# Patient Record
(demographics unavailable — no encounter records)

---

## 2024-10-28 NOTE — IMAGING
[Right] : right ankle [There are no fractures, subluxations or dislocations. No significant abnormalities are seen] : There are no fractures, subluxations or dislocations. No significant abnormalities are seen [Open growth plates] : Open growth plates

## 2024-10-28 NOTE — PHYSICAL EXAM
[Right] : right foot and ankle [NL (40)] : plantar flexion 40 degrees [NL 30)] : inversion 30 degrees [NL (20)] : eversion 20 degrees [5___] : eversion 5[unfilled]/5 [2+] : posterior tibialis pulse: 2+ [] : patient ambulates without assistive device [FreeTextEntry8] : Mildly tender lateral malleolus [de-identified] : Able to heel rise and jump on affected foot with minimal pain

## 2024-10-28 NOTE — HISTORY OF PRESENT ILLNESS
[5] : 5 [4] : 4 [Dull/Aching] : dull/aching [Localized] : localized [Constant] : constant [Standing] : standing [Walking] : walking [Stairs] : stairs [Student] : Work status: student [] : Post Surgical Visit: no [FreeTextEntry1] : Right ankle [FreeTextEntry5] : Patient tripped over the ball and rolled her right ankle today during a soccer game. is having pain with WB, pt plays for [employer].

## 2024-10-28 NOTE — ASSESSMENT
[FreeTextEntry1] : We reviewed the findings and the history. Questions were answered and concerns addressed. The options were outlined. Possibility of occult growth plate injury discussed. AirSport planned. No gym/sports this week. Rest, ice, NSAIDs. Will see Dr. Butler in 1 week for re evaluation and probable clearance.  Patient seen by Sherlyn HAHN who determined the assessment and plan and participated in all aspects of the office encounter.

## 2024-10-28 NOTE — REASON FOR VISIT
[FreeTextEntry2] : This is a 12 year old F with right ankle pain after she rolled it tripping over a ball during a soccer game today.  No prior history.  Pain with WB.  Is wrapped and icing currently.  Her parents are here. GAIL HARTMAN MS - SOCCER

## 2024-11-11 NOTE — RETURN TO WORK/SCHOOL
[FreeTextEntry1] :   To whom it may concern,   Diagnosis: right ankle sprain   _ Patient may return to work: _ Patient may not return to work until: _ Patient may return to school: _ Patient cannot participate in gym/sports until: x_ Patient may return to gym/sports on 11/5/24 _ Patient is on limited weight bearing: _ Patient may return to full activities: _ Patient requires early release from class/elevator pass for (or until):   SPECIAL INSTRUCTIONS:     _ Full duty, no restrictions. _ Light duty, as follows: _ Limited duty, as follows: _ With Cast   _  With Brace   _ With Crutches   _ With Boot     Sincerely,   Aryan Butler MD Co- Chief Sports Medicine [facility] Medical Director [facility]  [employer] [employer]

## 2024-11-11 NOTE — PHYSICAL EXAM
[Right] : right foot and ankle [NL (20)] : dorsiflexion 20 degrees [NL (40)] : plantar flexion 40 degrees [2+] : posterior tibialis pulse: 2+ [] : Sensation present to light touch in all distributions [de-identified] : 5/5 [de-identified] : able to single hop on ankle

## 2024-11-11 NOTE — PHYSICAL EXAM
[Right] : right foot and ankle [NL (20)] : dorsiflexion 20 degrees [NL (40)] : plantar flexion 40 degrees [2+] : posterior tibialis pulse: 2+ [] : Sensation present to light touch in all distributions [de-identified] : 5/5 [de-identified] : able to single hop on ankle

## 2024-11-11 NOTE — HISTORY OF PRESENT ILLNESS
[de-identified] : Pt is a 11 y/o female presenting of right ankle pain. Pt states she rolled her ankle during soccer 10/28/24. Hx of a sprain. Wearing Airsport, it is helping. Feeling better. WB slippers.  [FreeTextEntry1] : right ankle

## 2024-11-11 NOTE — HISTORY OF PRESENT ILLNESS
[de-identified] : Pt is a 13 y/o female presenting of right ankle pain. Pt states she rolled her ankle during soccer 10/28/24. Hx of a sprain. Wearing Airsport, it is helping. Feeling better. WB slippers.  [FreeTextEntry1] : right ankle

## 2024-11-11 NOTE — RETURN TO WORK/SCHOOL
[FreeTextEntry1] :   To whom it may concern,   Diagnosis: right ankle sprain   _ Patient may return to work: _ Patient may not return to work until: _ Patient may return to school: _ Patient cannot participate in gym/sports until: x_ Patient may return to gym/sports on 11/5/24 _ Patient is on limited weight bearing: _ Patient may return to full activities: _ Patient requires early release from class/elevator pass for (or until):   SPECIAL INSTRUCTIONS:     _ Full duty, no restrictions. _ Light duty, as follows: _ Limited duty, as follows: _ With Cast   _  With Brace   _ With Crutches   _ With Boot     Sincerely,   Aryan Butler MD Co- Chief Sports Medicine [facility] Medical Director [facility]  [facility] [employer]

## 2024-11-11 NOTE — DISCUSSION/SUMMARY
[de-identified] : The patient's condition is acute Confounding medical conditions/concerns:  N/A Tests/Studies Independently Interpreted Today: Reviewed [facility] xray ------------------------------------------------------------------------------------------------------------------   I spoke with the urgent care provider, reviewed notes, and images.   Discussed continued treatment options for pt's ankle sprain   The patient continues to improve on a gradual, interval basis reporting no recurrent symptoms or instability. Grossly benign physical examination upon office visit.  Pt can return to gym and sports as pain permits  Rec she play in laceup/tape/aircast.  The patient will begin use of laceup brace to ensure stability and avoid any recurrent instability/buckling events - fitted and dispensed in office today  Plan for PT   f/u in 4 weeks if pain persist .    I, María Hanson, attest that this documentation has been prepared under the direction and in the presence of Provider Dr. Aryan Butler